# Patient Record
Sex: FEMALE | Race: WHITE | Employment: FULL TIME | URBAN - METROPOLITAN AREA
[De-identification: names, ages, dates, MRNs, and addresses within clinical notes are randomized per-mention and may not be internally consistent; named-entity substitution may affect disease eponyms.]

---
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
## 2022-07-01 NOTE — PROGRESS NOTES
Administered MMR #2 and HPV #1 per MD orders. Advised to RTC tomorrow, 03/23/18 at [facility] to get Hep B #2 vaccine. Will give completed and updated school physical once HBV #2 has been given. [Negative] : Heme/Lymph [Lower Ext Edema] : lower extremity edema [FreeTextEntry3] : stye left eye

## 2024-07-16 ENCOUNTER — HOSPITAL ENCOUNTER (OUTPATIENT)
Age: 24
Discharge: HOME | End: 2024-07-16
Payer: COMMERCIAL

## 2024-07-16 VITALS
DIASTOLIC BLOOD PRESSURE: 70 MMHG | OXYGEN SATURATION: 99 % | RESPIRATION RATE: 19 BRPM | TEMPERATURE: 98 F | SYSTOLIC BLOOD PRESSURE: 110 MMHG | HEART RATE: 89 BPM

## 2024-07-16 DIAGNOSIS — N76.4 LABIAL ABSCESS: Primary | ICD-10-CM

## 2024-07-16 PROCEDURE — 99203 OFFICE O/P NEW LOW 30 MIN: CPT | Performed by: PHYSICIAN ASSISTANT

## 2024-07-16 RX ORDER — SULFAMETHOXAZOLE AND TRIMETHOPRIM 800; 160 MG/1; MG/1
1 TABLET ORAL 2 TIMES DAILY
Qty: 14 TABLET | Refills: 0 | Status: SHIPPED | OUTPATIENT
Start: 2024-07-16 | End: 2024-07-23

## 2024-07-16 NOTE — ED PROVIDER NOTES
Patient Seen in: [facility]      History     Chief Complaint   Patient presents with    Abscess     Stated Complaint: eval g    Subjective:   HPI    23-year-old female presenting to the immediate care for evaluation of an abscess. States about 1 week ago she noted swelling and discomfort to the area which has increased over the last few days. Noted drainage today. There is associated pain.  Denies fevers.    Objective:   No pertinent past medical history.            No pertinent past surgical history.              No pertinent social history.            Review of Systems    Positive for stated Chief Complaint: Abscess    Other systems are as noted in HPI.  Constitutional and vital signs reviewed.      All other systems reviewed and negative except as noted above.    Physical Exam     ED Triage Vitals [07/16/24 1534]   /70   Pulse 89   Resp 19   Temp 97.6 °F (36.4 °C)   Temp src Temporal   SpO2 99 %   O2 Device None (Room air)       Current Vitals:   Vital Signs  BP: 110/70  Pulse: 89  Resp: 19  Temp: 97.6 °F (36.4 °C)  Temp src: Temporal    Oxygen Therapy  SpO2: 99 %  O2 Device: None (Room air)            Physical Exam  Vitals and nursing note reviewed.   Constitutional:       General: She is not in acute distress.  HENT:      Head: Normocephalic and atraumatic.      Right Ear: External ear normal.      Left Ear: External ear normal.      Nose: Nose normal.      Mouth/Throat:      Mouth: Mucous membranes are moist.   Eyes:      Extraocular Movements: Extraocular movements intact.      Pupils: Pupils are equal, round, and reactive to light.   Cardiovascular:      Rate and Rhythm: Normal rate.   Pulmonary:      Effort: Pulmonary effort is normal.   Abdominal:      General: Abdomen is flat.   Genitourinary:     Comments: Fluctuant area to the outer aspect of R labia majora. Purulent drainage on palpation  Musculoskeletal:         General: Normal range of motion.      Cervical back: Normal range of  motion.   Skin:     General: Skin is warm.   Neurological:      General: No focal deficit present.      Mental Status: She is alert and oriented to person, place, and time.   Psychiatric:         Mood and Affect: Mood normal.         Behavior: Behavior normal.               ED Course   Labs Reviewed - No data to display      24 yo female here with c/o abscess to the outer area of R labia.  Area is already spontaneously draining.  I did apply pressure over the area with moderate amount of purulent material expressed.    Ddx-labial abscess, Bartholin gland abscess, cellulitis    Advised warm compresses, Bactrim Rx'd. supportive care and return precautions reviewed              [facility]                                         Medical Decision Making      Disposition and Plan     Clinical Impression:  1. Labial abscess         Disposition:  Discharge  7/16/2024  4:04 pm    Follow-up:  No follow-up provider specified.        Medications Prescribed:  Discharge Medication List as of 7/16/2024  4:06 PM        START taking these medications    Details   sulfamethoxazole-trimethoprim -160 MG Oral Tab per tablet Take 1 tablet by mouth 2 (two) times daily for 7 days., Normal, Disp-14 tablet, R-0

## 2024-09-23 ENCOUNTER — OFFICE VISIT (OUTPATIENT)
Dept: OBGYN CLINIC | Facility: CLINIC | Age: 24
End: 2024-09-23

## 2024-09-23 VITALS
SYSTOLIC BLOOD PRESSURE: 107 MMHG | HEIGHT: 61 IN | HEART RATE: 85 BPM | WEIGHT: 211 LBS | DIASTOLIC BLOOD PRESSURE: 79 MMHG | BODY MASS INDEX: 39.84 KG/M2

## 2024-09-23 DIAGNOSIS — Z12.4 PAP SMEAR FOR CERVICAL CANCER SCREENING: Primary | ICD-10-CM

## 2024-09-23 DIAGNOSIS — N93.9 ABNORMAL UTERINE BLEEDING (AUB): ICD-10-CM

## 2024-09-23 DIAGNOSIS — N94.6 DYSMENORRHEA: ICD-10-CM

## 2024-09-23 NOTE — PROGRESS NOTES
[facility]  Obstetrics and Gynecology  Focused Gynecology Problem Exam      Monica Chavez is a 23 year old female presenting for Irregular Menstruation (NP, heavy cycles with clots )    Here with sirisha  HPI:     Chief Complaint   Patient presents with    Irregular Menstruation     NP, heavy cycles with clots      Heavy cycles will last about 8 day, cycles are about [id number]  Menarche 11, initially ok but every month starting having heavier periods  +dysmenorrhea  Has increased in weight  No regular exercise    Denies constipation or diarrhea     Working- warehouse; packaging  Going to school for [employer]   Lives with parents    Never had pap  Never sexually active  S/p hpv vaccines    Period Cycle (Days): 24 (2024  3:36 PM)  Period Duration (Days): 14 (2024  3:36 PM)  Period Flow: heavy wioth clots (2024  3:36 PM)  Use of Birth Control (if yes, specify type): Abstinence (2024  3:36 PM)  Hx Prior Abnormal Pap: No (2024  3:36 PM)      Medications (Active prior to today's visit):  No current outpatient medications on file.     Allergies:  Allergies   Allergen Reactions    Penicillins HIVES    Flour      Hot sauce as well    Peanut Butter Flavor     Penicillins      HISTORY:     OB History    Para Term  AB Living   0 0 0 0 0 0   SAB IAB Ectopic Multiple Live Births   0 0 0 0 0         History reviewed. No pertinent past medical history.    History reviewed. No pertinent surgical history.    History reviewed. No pertinent family history.    Social History     Socioeconomic History    Marital status: Single     Spouse name: Not on file    Number of children: Not on file    Years of education: Not on file    Highest education level: Not on file   Occupational History    Not on file   Tobacco Use    Smoking status: Never    Smokeless tobacco: Never   Substance and Sexual Activity    Alcohol use: No    Drug use: Never    Sexual activity: Not on file   Other  Topics Concern    Caffeine Concern Not Asked    Exercise Not Asked    Seat Belt Not Asked    Special Diet Not Asked    Stress Concern Not Asked    Weight Concern Not Asked   Social History Narrative    Not on file     Social Determinants of Health     Financial Resource Strain: Not on file   Food Insecurity: Not on file   Transportation Needs: Not on file   Physical Activity: Not on file   Stress: Not on file   Social Connections: Not on file   Housing Stability: Not on file       ROS:   Review of Systems:    Constitutional:    denies fever / chills  Eyes:     denies blurred or double vision  Cardiovascular:  denies chest pain or palpitations  Respiratory:    denies shortness of breath  Gastrointestinal:  denies severe abdominal pain, frequent diarrhea or constipation, nausea / vomiting  Genitourinary:    denies dysuria, bothersome incontinence  Skin/Breast:   denies any breast pain, lumps, or discharge  Neurological:    denies frequent severe headaches  Psychiatric:   denies depression or anxiety, thoughts of harming self or others  Heme/Lymph:    denies easy bruising or bleeding  PHYSICAL EXAM:   /79   Pulse 85   Ht 5' 1\" (1.549 m)   Wt 211 lb (95.7 kg)   LMP 09/06/2024   BMI 39.87 kg/m²     GENERAL: well developed, well nourished, in no apparent distress  ABDOMEN: Soft, non distended; non tender, no masses  GYNE/: External Genitalia: Normal appearing, no lesions. Urethral meatus appear wnl, no abnormal discharge or lesions noted.          Bladder: well supported, urethra wnl, no lesions or fissures                     Vagina: normal pink mucosa, no lesions, normal clear discharge.                      Uterus: anteverted, mobile, non tender, normal size                     Cervix: Normal                      Adnexa: non tender, no masses, normal size     ASSESSMENT:    Pt counseled on possible etiologies of heavy periods and/or irregular bleeding including uterine fibroids, DUB/anovulatory bleeding  and other benign and less common malignant etiologies.  Discussed possible work-up options including exam, pelvic US and endometrial biopsy.  Discussed treatment options including medical and surgical.  Medical options include OCPs, Nuvaring, patch for cycle control along with depo provera for menstrual suppression.  Discussed Mirena IUD and menstrual benefits.  Pt counseled on risks/benefits of each of the appropriate options.  Will begin workup with pelvic US. Discussed TXA use as well.   Consider EMB if worsening bleeding or aub despite treatment      ICD-10-CM    1. Pap smear for cervical cancer screening  Z12.4 ThinPrep PAP Smear [E]     ThinPrep PAP Smear [E]      2. Dysmenorrhea  N94.6 US PELVIS (TRANSABDOMINAL AND TRANSVAGINAL) (CPT=76856/51120)      3. Abnormal uterine bleeding (AUB)  N93.9 US PELVIS (TRANSABDOMINAL AND TRANSVAGINAL) (CPT=76856/02502)      4. BMI 39.0-39.9,adult  Z68.39             BMI 39  Recommend intensive lifestyle and behavioral modifications at this time for weight loss  Avoid processed, poor quality carbohydrates, refined grains, flour and sugar  Exercise goals:   Aim for 150 minutes of moderate level exercise weekly with 2-3 days of strength training    Behavior Modification:  Plan meals in advance.  Read nutrition labels    Drink 64 ounces of noncaloric beverages per day no fruit juices regular soda    Maintain  food journal    Utilize portion control strategies to reduce calorie intake  Identify triggers for eating  Try to eat slowly and sitting down. Aim for 20 to 30 minutes to complete a meal     Nutritional Goals :           Calorie-controlled diet: 1500 aldo, Limit carbohydrates to <100 gms per day, Protein goal of 120-130 gms per day.  fiber  25-35g   Goals:  Keep a food log  Aim for 150 minutes of moderate exercise per week  Increase fruit and vegetable servings to 5 to 6/day  Improve sleep and stress  Weight loss printed instructions/ info given    PLAN:   Rtc prn      ORDERS:     Orders Placed This Encounter   Procedures    ThinPrep PAP Smear [E]     PRESCRIPTIONS:     Requested Prescriptions      No prescriptions requested or ordered in this encounter     IMAGING/ REFERRALS:    US PELVIS (TRANSABDOMINAL AND TRANSVAGINAL) (CPT=76856/42518)     Total time spent 45 minutes this calendar day which includes preparing to see the patient including chart review, obtaining and/or reviewing additional medical history, performing a physical exam and evaluation, documenting clinical information in the electronic medical record, independently interpreting results, counseling the patient, communicating results to the patient/family/caregiver and coordinating care.         Anuradha Tabor MD  9/23/2024  3:48 PM

## 2024-12-30 ENCOUNTER — HOSPITAL ENCOUNTER (OUTPATIENT)
Age: 24
Discharge: HOME | End: 2024-12-30
Payer: COMMERCIAL

## 2024-12-30 ENCOUNTER — APPOINTMENT (OUTPATIENT)
Dept: GENERAL RADIOLOGY | Age: 24
End: 2024-12-30
Attending: NURSE PRACTITIONER
Payer: COMMERCIAL

## 2024-12-30 VITALS
TEMPERATURE: 98 F | OXYGEN SATURATION: 100 % | DIASTOLIC BLOOD PRESSURE: 66 MMHG | RESPIRATION RATE: 14 BRPM | HEART RATE: 94 BPM | SYSTOLIC BLOOD PRESSURE: 111 MMHG

## 2024-12-30 DIAGNOSIS — J06.9 UPPER RESPIRATORY VIRUS: Primary | ICD-10-CM

## 2024-12-30 LAB
POCT INFLUENZA A: NEGATIVE
POCT INFLUENZA B: NEGATIVE

## 2024-12-30 PROCEDURE — 71046 X-RAY EXAM CHEST 2 VIEWS: CPT | Performed by: NURSE PRACTITIONER

## 2024-12-30 PROCEDURE — 99213 OFFICE O/P EST LOW 20 MIN: CPT | Performed by: NURSE PRACTITIONER

## 2024-12-30 PROCEDURE — 87502 INFLUENZA DNA AMP PROBE: CPT | Performed by: NURSE PRACTITIONER

## 2024-12-30 RX ORDER — BENZONATATE 100 MG/1
200 CAPSULE ORAL 3 TIMES DAILY PRN
Qty: 30 CAPSULE | Refills: 0 | Status: SHIPPED | OUTPATIENT
Start: 2024-12-30 | End: 2025-01-29

## 2024-12-30 NOTE — ED PROVIDER NOTES
He    Patient Seen in: [facility]      History     Chief Complaint   Patient presents with    Cough     Stated Complaint: Flu; COVID19 Neg; Cough; Runny Nose; Fever  Subjective:   24-year-old healthy female presents for URI symptoms for the past 5 days.  Positive sick contacts at home with the same symptoms.  She has a productive cough with green and yellow sputum, nasal congestion, sore throat, body aches, and chills.  No chest pain or difficulty breathing.  She is eating and drinking without vomiting or diarrhea.  No neck stiffness.  No rashes.  No headaches.  No dizziness.  She tested negative for COVID at home.  She appears nontoxic.      Objective:   History reviewed. No pertinent past medical history.         History reviewed. No pertinent surgical history.           Social History     Socioeconomic History    Marital status: Single   Tobacco Use    Smoking status: Never    Smokeless tobacco: Never   Substance and Sexual Activity    Alcohol use: No    Drug use: Never            Review of Systems    Positive for stated complaint: Cough     Other systems are as noted in HPI.  Constitutional and vital signs reviewed.      All other systems reviewed and negative except as noted above.    Physical Exam     ED Triage Vitals [12/30/24 1546]   /66   Pulse 94   Resp 14   Temp 98.4 °F (36.9 °C)   Temp src Oral   SpO2 100 %   O2 Device None (Room air)     Current:/66   Pulse 94   Temp 98.4 °F (36.9 °C) (Oral)   Resp 14   LMP 12/30/2024   SpO2 100%     Physical Exam  Vitals and nursing note reviewed.   Constitutional:       General: She is not in acute distress.     Appearance: Normal appearance. She is not toxic-appearing.   HENT:      Head: Normocephalic.      Right Ear: Tympanic membrane normal.      Left Ear: Tympanic membrane normal.      Nose: Congestion present. No rhinorrhea.      Mouth/Throat:      Mouth: Mucous membranes are moist.      Pharynx: Oropharyngeal exudate and posterior  oropharyngeal erythema present.   Eyes:      Extraocular Movements: Extraocular movements intact.      Conjunctiva/sclera: Conjunctivae normal.      Pupils: Pupils are equal, round, and reactive to light.   Cardiovascular:      Rate and Rhythm: Normal rate and regular rhythm.   Pulmonary:      Effort: Pulmonary effort is normal.      Breath sounds: Normal breath sounds. No wheezing, rhonchi or rales.   Chest:      Chest wall: No tenderness.   Musculoskeletal:         General: Normal range of motion.      Cervical back: Normal range of motion and neck supple.   Lymphadenopathy:      Cervical: No cervical adenopathy.   Skin:     General: Skin is warm and dry.      Capillary Refill: Capillary refill takes less than 2 seconds.      Findings: No rash.   Neurological:      General: No focal deficit present.      Mental Status: She is alert and oriented to person, place, and time.   Psychiatric:         Mood and Affect: Mood normal.         Behavior: Behavior normal.         ED Course   No results found.  Labs Reviewed   POCT FLU TEST - Normal    Narrative:     This assay is a rapid molecular in vitro test utilizing nucleic acid amplification of influenza A and B viral RNA.       MDM     Medical Decision Making  The patient is aware of the testing results below.  Her symptoms are most likely viral.  I will prescribe her Tessalon Perles to help with the cough.  We discussed supportive care including Tylenol or Motrin as needed for pain or fever, pushing fluids, and rest.  She will follow-up as needed with her primary care provider.    Amount and/or Complexity of Data Reviewed  Independent Historian: parent  Labs: ordered.     Details: Influenza negative  Negative home COVID test.  Radiology: ordered.     Details: I visualized the chest x-ray images which are negative for any pneumonia or other acute process.    Risk  OTC drugs.  Prescription drug management.  Risk Details: URI versus pneumonia versus COVID versus  influenza        Disposition and Plan     Clinical Impression:  1. Upper respiratory virus         Disposition:  Discharge  12/30/2024  5:05 pm    Follow-up:  Kay Harrington MD  [address] [address]  [phone]    Schedule an appointment as soon as possible for a visit   As needed          Medications Prescribed:  Discharge Medication List as of 12/30/2024  5:08 PM        START taking these medications    Details   benzonatate 100 MG Oral Cap Take 2 capsules (200 mg total) by mouth 3 (three) times daily as needed., Normal, Disp-30 capsule, R-0

## (undated) NOTE — LETTER
[email or web address] of Child Health Examination       Student's Name  Derick Steiner Birth Date (If adding dates to the above immunization history section, put your initials by date(s) and sign here.)   ALTERNATIVE PROOF OF IMMUNITY   1.Clinical diagnosis (measles, mumps, hepatits B) is allowed when verified by physician & supported with lab confirma Child wakes during the night coughing   Yes   No    Yes   No    Loss of function of one of paired organs? (eye/ear/kidney/testicle)   Yes   No      Birth Defects? Developmental delay? Yes   No    Yes   No  Hospitalizations? When? What for?    Yes   No Family History NO    Ethnic Minority  YES          Signs of Insulin Resistance (hypertension, dyslipidemia, polycystic ovarian syndrome, acanthosis nigricans)    NO           At Risk   NO   Lead Risk Questionnaire  Req'd for children 6 months thru 6 yrs en NEEDS/MODIFICATIONS required in the school setting  NONE DIETARY Needs/Restrictions     NONE   SPECIAL INSTRUCTIONS/DEVICES e.g. safety glasses, glass eye, chest protector for arrhythmia, pacemaker, prosthetic device, dental bridge, false teeth, athleticsu

## (undated) NOTE — LETTER
Date & Time: 7/16/2024, 4:07 PM  Patient: Monica Chavez  Encounter Provider(s):    Karo Breaux PA-C       To Whom It May Concern:    Monica Chavez was seen and treated in our department on 7/16/2024. She can return to work 7/22/2024.    If you have any questions or concerns, please do not hesitate to call.        _____________________________  Physician/APC Signature